# Patient Record
Sex: FEMALE | Race: BLACK OR AFRICAN AMERICAN | NOT HISPANIC OR LATINO | URBAN - METROPOLITAN AREA
[De-identification: names, ages, dates, MRNs, and addresses within clinical notes are randomized per-mention and may not be internally consistent; named-entity substitution may affect disease eponyms.]

---

## 2021-12-22 ENCOUNTER — EMERGENCY (EMERGENCY)
Facility: HOSPITAL | Age: 71
LOS: 1 days | Discharge: HOME | End: 2021-12-22
Attending: STUDENT IN AN ORGANIZED HEALTH CARE EDUCATION/TRAINING PROGRAM
Payer: MEDICARE

## 2021-12-22 VITALS
OXYGEN SATURATION: 95 % | TEMPERATURE: 98 F | DIASTOLIC BLOOD PRESSURE: 61 MMHG | HEART RATE: 60 BPM | RESPIRATION RATE: 18 BRPM | SYSTOLIC BLOOD PRESSURE: 100 MMHG

## 2021-12-22 VITALS
HEART RATE: 76 BPM | DIASTOLIC BLOOD PRESSURE: 101 MMHG | RESPIRATION RATE: 19 BRPM | OXYGEN SATURATION: 98 % | TEMPERATURE: 98 F | SYSTOLIC BLOOD PRESSURE: 159 MMHG | HEIGHT: 61 IN | WEIGHT: 102.07 LBS

## 2021-12-22 LAB
ALBUMIN SERPL ELPH-MCNC: 4.1 G/DL — SIGNIFICANT CHANGE UP (ref 3.3–5)
ALP SERPL-CCNC: 53 U/L — SIGNIFICANT CHANGE UP (ref 40–120)
ALT FLD-CCNC: 21 U/L — SIGNIFICANT CHANGE UP (ref 10–45)
ANION GAP SERPL CALC-SCNC: 15 MMOL/L — SIGNIFICANT CHANGE UP (ref 5–17)
APPEARANCE UR: CLEAR — SIGNIFICANT CHANGE UP
AST SERPL-CCNC: 29 U/L — SIGNIFICANT CHANGE UP (ref 10–40)
BACTERIA # UR AUTO: NEGATIVE — SIGNIFICANT CHANGE UP
BASE EXCESS BLDV CALC-SCNC: -1.2 MMOL/L — SIGNIFICANT CHANGE UP (ref -2–2)
BASOPHILS # BLD AUTO: 0.02 K/UL — SIGNIFICANT CHANGE UP (ref 0–0.2)
BASOPHILS NFR BLD AUTO: 0.2 % — SIGNIFICANT CHANGE UP (ref 0–2)
BILIRUB SERPL-MCNC: 0.4 MG/DL — SIGNIFICANT CHANGE UP (ref 0.2–1.2)
BILIRUB UR-MCNC: NEGATIVE — SIGNIFICANT CHANGE UP
BUN SERPL-MCNC: 13 MG/DL — SIGNIFICANT CHANGE UP (ref 7–23)
CA-I SERPL-SCNC: 1.16 MMOL/L — SIGNIFICANT CHANGE UP (ref 1.15–1.33)
CALCIUM SERPL-MCNC: 9.3 MG/DL — SIGNIFICANT CHANGE UP (ref 8.4–10.5)
CHLORIDE BLDV-SCNC: 103 MMOL/L — SIGNIFICANT CHANGE UP (ref 96–108)
CHLORIDE SERPL-SCNC: 100 MMOL/L — SIGNIFICANT CHANGE UP (ref 96–108)
CO2 BLDV-SCNC: 28 MMOL/L — HIGH (ref 22–26)
CO2 SERPL-SCNC: 22 MMOL/L — SIGNIFICANT CHANGE UP (ref 22–31)
COLOR SPEC: COLORLESS — SIGNIFICANT CHANGE UP
CREAT SERPL-MCNC: 1.06 MG/DL — SIGNIFICANT CHANGE UP (ref 0.5–1.3)
DIFF PNL FLD: NEGATIVE — SIGNIFICANT CHANGE UP
EOSINOPHIL # BLD AUTO: 0.01 K/UL — SIGNIFICANT CHANGE UP (ref 0–0.5)
EOSINOPHIL NFR BLD AUTO: 0.1 % — SIGNIFICANT CHANGE UP (ref 0–6)
EPI CELLS # UR: 1 /HPF — SIGNIFICANT CHANGE UP
GAS PNL BLDV: 134 MMOL/L — LOW (ref 136–145)
GAS PNL BLDV: SIGNIFICANT CHANGE UP
GLUCOSE BLDV-MCNC: 104 MG/DL — HIGH (ref 70–99)
GLUCOSE SERPL-MCNC: 170 MG/DL — HIGH (ref 70–99)
GLUCOSE UR QL: NEGATIVE — SIGNIFICANT CHANGE UP
HCO3 BLDV-SCNC: 26 MMOL/L — SIGNIFICANT CHANGE UP (ref 22–29)
HCT VFR BLD CALC: 43.2 % — SIGNIFICANT CHANGE UP (ref 34.5–45)
HCT VFR BLDA CALC: 42 % — SIGNIFICANT CHANGE UP (ref 34.5–46.5)
HGB BLD CALC-MCNC: 14.1 G/DL — SIGNIFICANT CHANGE UP (ref 11.7–16.1)
HGB BLD-MCNC: 13.7 G/DL — SIGNIFICANT CHANGE UP (ref 11.5–15.5)
HYALINE CASTS # UR AUTO: 1 /LPF — SIGNIFICANT CHANGE UP (ref 0–2)
IMM GRANULOCYTES NFR BLD AUTO: 0.5 % — SIGNIFICANT CHANGE UP (ref 0–1.5)
KETONES UR-MCNC: NEGATIVE — SIGNIFICANT CHANGE UP
LACTATE BLDV-MCNC: 1.9 MMOL/L — SIGNIFICANT CHANGE UP (ref 0.7–2)
LEUKOCYTE ESTERASE UR-ACNC: NEGATIVE — SIGNIFICANT CHANGE UP
LIDOCAIN IGE QN: 19 U/L — SIGNIFICANT CHANGE UP (ref 7–60)
LYMPHOCYTES # BLD AUTO: 1.35 K/UL — SIGNIFICANT CHANGE UP (ref 1–3.3)
LYMPHOCYTES # BLD AUTO: 16.7 % — SIGNIFICANT CHANGE UP (ref 13–44)
MCHC RBC-ENTMCNC: 27.2 PG — SIGNIFICANT CHANGE UP (ref 27–34)
MCHC RBC-ENTMCNC: 31.7 GM/DL — LOW (ref 32–36)
MCV RBC AUTO: 85.9 FL — SIGNIFICANT CHANGE UP (ref 80–100)
MONOCYTES # BLD AUTO: 0.46 K/UL — SIGNIFICANT CHANGE UP (ref 0–0.9)
MONOCYTES NFR BLD AUTO: 5.7 % — SIGNIFICANT CHANGE UP (ref 2–14)
NEUTROPHILS # BLD AUTO: 6.19 K/UL — SIGNIFICANT CHANGE UP (ref 1.8–7.4)
NEUTROPHILS NFR BLD AUTO: 76.8 % — SIGNIFICANT CHANGE UP (ref 43–77)
NITRITE UR-MCNC: NEGATIVE — SIGNIFICANT CHANGE UP
NRBC # BLD: 0 /100 WBCS — SIGNIFICANT CHANGE UP (ref 0–0)
PCO2 BLDV: 53 MMHG — HIGH (ref 39–42)
PH BLDV: 7.3 — LOW (ref 7.32–7.43)
PH UR: 6.5 — SIGNIFICANT CHANGE UP (ref 5–8)
PLATELET # BLD AUTO: 245 K/UL — SIGNIFICANT CHANGE UP (ref 150–400)
PO2 BLDV: 35 MMHG — SIGNIFICANT CHANGE UP (ref 25–45)
POTASSIUM BLDV-SCNC: 4.3 MMOL/L — SIGNIFICANT CHANGE UP (ref 3.5–5.1)
POTASSIUM SERPL-MCNC: 4.1 MMOL/L — SIGNIFICANT CHANGE UP (ref 3.5–5.3)
POTASSIUM SERPL-SCNC: 4.1 MMOL/L — SIGNIFICANT CHANGE UP (ref 3.5–5.3)
PROT SERPL-MCNC: 7 G/DL — SIGNIFICANT CHANGE UP (ref 6–8.3)
PROT UR-MCNC: NEGATIVE — SIGNIFICANT CHANGE UP
RBC # BLD: 5.03 M/UL — SIGNIFICANT CHANGE UP (ref 3.8–5.2)
RBC # FLD: 12.8 % — SIGNIFICANT CHANGE UP (ref 10.3–14.5)
RBC CASTS # UR COMP ASSIST: 3 /HPF — SIGNIFICANT CHANGE UP (ref 0–4)
SAO2 % BLDV: 56.5 % — LOW (ref 67–88)
SARS-COV-2 RNA SPEC QL NAA+PROBE: DETECTED
SODIUM SERPL-SCNC: 137 MMOL/L — SIGNIFICANT CHANGE UP (ref 135–145)
SP GR SPEC: 1.03 — HIGH (ref 1.01–1.02)
UROBILINOGEN FLD QL: NEGATIVE — SIGNIFICANT CHANGE UP
WBC # BLD: 8.07 K/UL — SIGNIFICANT CHANGE UP (ref 3.8–10.5)
WBC # FLD AUTO: 8.07 K/UL — SIGNIFICANT CHANGE UP (ref 3.8–10.5)
WBC UR QL: 1 /HPF — SIGNIFICANT CHANGE UP (ref 0–5)

## 2021-12-22 PROCEDURE — 93010 ELECTROCARDIOGRAM REPORT: CPT

## 2021-12-22 PROCEDURE — 84295 ASSAY OF SERUM SODIUM: CPT

## 2021-12-22 PROCEDURE — 82330 ASSAY OF CALCIUM: CPT

## 2021-12-22 PROCEDURE — 82803 BLOOD GASES ANY COMBINATION: CPT

## 2021-12-22 PROCEDURE — 85025 COMPLETE CBC W/AUTO DIFF WBC: CPT

## 2021-12-22 PROCEDURE — 82947 ASSAY GLUCOSE BLOOD QUANT: CPT

## 2021-12-22 PROCEDURE — 83690 ASSAY OF LIPASE: CPT

## 2021-12-22 PROCEDURE — 99284 EMERGENCY DEPT VISIT MOD MDM: CPT | Mod: 25

## 2021-12-22 PROCEDURE — G1004: CPT

## 2021-12-22 PROCEDURE — 85014 HEMATOCRIT: CPT

## 2021-12-22 PROCEDURE — 99283 EMERGENCY DEPT VISIT LOW MDM: CPT

## 2021-12-22 PROCEDURE — 36415 COLL VENOUS BLD VENIPUNCTURE: CPT

## 2021-12-22 PROCEDURE — 96375 TX/PRO/DX INJ NEW DRUG ADDON: CPT

## 2021-12-22 PROCEDURE — U0003: CPT

## 2021-12-22 PROCEDURE — U0005: CPT

## 2021-12-22 PROCEDURE — 81001 URINALYSIS AUTO W/SCOPE: CPT

## 2021-12-22 PROCEDURE — 74177 CT ABD & PELVIS W/CONTRAST: CPT | Mod: MG

## 2021-12-22 PROCEDURE — 99285 EMERGENCY DEPT VISIT HI MDM: CPT | Mod: CS,GC

## 2021-12-22 PROCEDURE — 96374 THER/PROPH/DIAG INJ IV PUSH: CPT | Mod: XU

## 2021-12-22 PROCEDURE — 82435 ASSAY OF BLOOD CHLORIDE: CPT

## 2021-12-22 PROCEDURE — 83605 ASSAY OF LACTIC ACID: CPT

## 2021-12-22 PROCEDURE — 84132 ASSAY OF SERUM POTASSIUM: CPT

## 2021-12-22 PROCEDURE — 87086 URINE CULTURE/COLONY COUNT: CPT

## 2021-12-22 PROCEDURE — 80053 COMPREHEN METABOLIC PANEL: CPT

## 2021-12-22 PROCEDURE — 74177 CT ABD & PELVIS W/CONTRAST: CPT | Mod: 26,MG

## 2021-12-22 PROCEDURE — 85018 HEMOGLOBIN: CPT

## 2021-12-22 RX ORDER — KETOROLAC TROMETHAMINE 30 MG/ML
15 SYRINGE (ML) INJECTION ONCE
Refills: 0 | Status: COMPLETED | OUTPATIENT
Start: 2021-12-22 | End: 2021-12-22

## 2021-12-22 RX ORDER — ONDANSETRON 8 MG/1
4 TABLET, FILM COATED ORAL ONCE
Refills: 0 | Status: COMPLETED | OUTPATIENT
Start: 2021-12-22 | End: 2021-12-22

## 2021-12-22 RX ORDER — MORPHINE SULFATE 50 MG/1
4 CAPSULE, EXTENDED RELEASE ORAL ONCE
Refills: 0 | Status: DISCONTINUED | OUTPATIENT
Start: 2021-12-22 | End: 2021-12-22

## 2021-12-22 RX ORDER — TAMSULOSIN HYDROCHLORIDE 0.4 MG/1
1 CAPSULE ORAL
Qty: 30 | Refills: 0
Start: 2021-12-22 | End: 2022-01-20

## 2021-12-22 RX ORDER — IBUPROFEN 200 MG
1 TABLET ORAL
Qty: 42 | Refills: 0
Start: 2021-12-22 | End: 2021-12-28

## 2021-12-22 RX ORDER — ACETAMINOPHEN 500 MG
2 TABLET ORAL
Qty: 56 | Refills: 0
Start: 2021-12-22 | End: 2021-12-28

## 2021-12-22 RX ORDER — SODIUM CHLORIDE 9 MG/ML
1000 INJECTION INTRAMUSCULAR; INTRAVENOUS; SUBCUTANEOUS ONCE
Refills: 0 | Status: COMPLETED | OUTPATIENT
Start: 2021-12-22 | End: 2021-12-22

## 2021-12-22 RX ADMIN — MORPHINE SULFATE 4 MILLIGRAM(S): 50 CAPSULE, EXTENDED RELEASE ORAL at 11:14

## 2021-12-22 RX ADMIN — SODIUM CHLORIDE 1000 MILLILITER(S): 9 INJECTION INTRAMUSCULAR; INTRAVENOUS; SUBCUTANEOUS at 11:14

## 2021-12-22 RX ADMIN — ONDANSETRON 4 MILLIGRAM(S): 8 TABLET, FILM COATED ORAL at 11:14

## 2021-12-22 NOTE — CONSULT NOTE ADULT - SUBJECTIVE AND OBJECTIVE BOX
HPI:  Patient is a 71y Female who presented with  PAST MEDICAL & SURGICAL HISTORY:  Kidney stone      FAMILY HISTORY:    SOCIAL HISTORY:   Tobacco hx:    MEDICATIONS  (STANDING):  ketorolac   Injectable 15 milliGRAM(s) IV Push Once    MEDICATIONS  (PRN):    Allergies    amoxicillin (Unknown)  ampicillin (Unknown)    Intolerances        REVIEW OF SYSTEMS: Pertinent positives and negatives as stated in HPI, otherwise negative    Vital signs  T(C): 36.6 (21 @ 11:27), Max: 36.6 (21 @ 10:17)  HR: 60 (21 @ 11:27)  BP: 165/83 (21 @ 11:27)  SpO2: 96% (21 @ 11:27)  Wt(kg): --    Output    UOP    Physical Exam  Gen: NAD  Pulm: No intercostal retractions  Back:  Abd: Soft, NT, ND  : Uncircumcised/Circumcised, no lesions.  No discharge or blood at urethral meatus.  Testes descended bilaterally.  Testes and epididymis nontender bilaterally.  Cremasteric reflex present bilaterally.    LABS:           @ 11:11    WBC 8.07  / Hct 43.2  / SCr 1.06         137  |  100  |  13  ----------------------------<  170<H>  4.1   |  22  |  1.06    Ca    9.3      22 Dec 2021 11:11    TPro  7.0  /  Alb  4.1  /  TBili  0.4  /  DBili  x   /  AST  29  /  ALT  21  /  AlkPhos  53        Urinalysis Basic - ( 22 Dec 2021 13:47 )    Color: Colorless / Appearance: Clear / S.035 / pH: x  Gluc: x / Ketone: Negative  / Bili: Negative / Urobili: Negative   Blood: x / Protein: Negative / Nitrite: Negative   Leuk Esterase: Negative / RBC: 3 /hpf / WBC 1 /HPF   Sq Epi: x / Non Sq Epi: 1 /hpf / Bacteria: Negative        Urine Cx:   Blood Cx:    RADIOLOGY:       HPI:  Patient is a 71y Female who presented with  PAST MEDICAL & SURGICAL HISTORY:  Kidney stone      FAMILY HISTORY:    SOCIAL HISTORY:   Tobacco hx:    MEDICATIONS  (STANDING):  ketorolac   Injectable 15 milliGRAM(s) IV Push Once    MEDICATIONS  (PRN):    Allergies    amoxicillin (Unknown)  ampicillin (Unknown)    Intolerances        REVIEW OF SYSTEMS: Pertinent positives and negatives as stated in HPI, otherwise negative    Vital signs  T(C): 36.6 (21 @ 11:27), Max: 36.6 (21 @ 10:17)  HR: 60 (21 @ 11:27)  BP: 165/83 (21 @ 11:27)  SpO2: 96% (21 @ 11:27)  Wt(kg): --    Output    UOP    Physical Exam  Gen: NAD  Pulm: No intercostal retractions  Back:  Abd: Soft, NT, ND  : Uncircumcised/Circumcised, no lesions.  No discharge or blood at urethral meatus.  Testes descended bilaterally.  Testes and epididymis nontender bilaterally.  Cremasteric reflex present bilaterally.    LABS:           @ 11:11    WBC 8.07  / Hct 43.2  / SCr 1.06         137  |  100  |  13  ----------------------------<  170<H>  4.1   |  22  |  1.06    Ca    9.3      22 Dec 2021 11:11    TPro  7.0  /  Alb  4.1  /  TBili  0.4  /  DBili  x   /  AST  29  /  ALT  21  /  AlkPhos  53        Urinalysis Basic - ( 22 Dec 2021 13:47 )    Color: Colorless / Appearance: Clear / S.035 / pH: x  Gluc: x / Ketone: Negative  / Bili: Negative / Urobili: Negative   Blood: x / Protein: Negative / Nitrite: Negative   Leuk Esterase: Negative / RBC: 3 /hpf / WBC 1 /HPF   Sq Epi: x / Non Sq Epi: 1 /hpf / Bacteria: Negative        Urine Cx:   Blood Cx:    RADIOLOGY:    ACC: [id number] EXAM:  CT ABDOMEN AND PELVIS IC                          PROCEDURE DATE:  2021          INTERPRETATION:  CLINICAL INFORMATION: History of kidney stones, presents   with right flank pain.    COMPARISON: None.    CONTRAST/COMPLICATIONS:  IV Contrast: Omnipaque 350  90 cc administered   10 cc discarded  Oral Contrast: NONE  Complications: None reported at time of study completion    PROCEDURE:  CT of the Abdomen and Pelvis was performed.  Sagittal and coronal reformats were performed.    FINDINGS:  LOWER CHEST: Within normal limits.    LIVER: Periportal edema.  BILE DUCTS: Normal caliber.  GALLBLADDER: Within normal limits.  SPLEEN: Within normal limits.  PANCREAS: Pancreatic duct is dilated, measuring 5 mm. No pancreatic mass.  ADRENALS: Within normal limits.  KIDNEYS/URETERS: Mild right hydroureteronephrosis. 7 mm calculus in the   proximal ureter. Free retroperitoneal fluid surrounding the renal pelvis   and proximal ureter likely reflects forniceal rupture. No left-sided   renal calculi.    BLADDER: Within normal limits.  REPRODUCTIVE ORGANS: 2 cm left adnexal mass, likely subserosal uterine   fibroid. 7 x 5 cm right adnexal cyst    BOWEL: No bowel obstruction. Appendix not well visualized.  PERITONEUM: No ascites.  VESSELS: Mild atheromatous calcifications.  RETROPERITONEUM/LYMPH NODES: No lymphadenopathy.  ABDOMINAL WALL: Within normal limits.  BONES: Disc degeneration lumbosacral junction.    IMPRESSION:  7 mm obstructive calculus proximal right ureter with evidence of   forniceal rupture.    7 cm right adnexal cyst, and smaller left adnexal lesion thought to   represent subserosal fibroid. Consider sonographic correlation.    Unexplained mild dilatation of pancreatic duct. Consider MRCP for further   evaluation.        --- End of Report ---            MONTANA PATTON MD; Attending Radiologist  This document has been electronically signed. Dec 22 2021  1:09PM     HPI:  Patient is a 71y Female who presented with right flank pain x few days.  Pt states the pain has been intermittent and manageable without any analgesics but today became too intense tolerate at home.  +nausea and vomiting x 1 earlier today.  Pt also states she had low grade temp 100.9F few days ago.  Denies any dysuria, difficulty voiding, gross hematuria.  States she has been aware of this stone for ~ one year.  PCP had conversations about it passing spontaneously but has never seen an urologist       PAST MEDICAL & SURGICAL HISTORY:  Kidney stone      FAMILY HISTORY:    SOCIAL HISTORY:   Tobacco hx:    MEDICATIONS  (STANDING):  ketorolac   Injectable 15 milliGRAM(s) IV Push Once    MEDICATIONS  (PRN):    Allergies    amoxicillin (Unknown)  ampicillin (Unknown)    Intolerances        REVIEW OF SYSTEMS: Pertinent positives and negatives as stated in HPI, otherwise negative    Vital signs  T(C): 36.6 (21 @ 11:27), Max: 36.6 (21 @ 10:17)  HR: 60 (21 @ 11:27)  BP: 165/83 (21 @ 11:27)  SpO2: 96% (21 @ 11:27)  Wt(kg): --    Output    UOP    Physical Exam  Gen: NAD  Pulm: No intercostal retractions  Back: No CVAT b/l  Abd: Soft, NT, ND  : wnl      LABS:       @ 11:11    WBC 8.07  / Hct 43.2  / SCr 1.06         137  |  100  |  13  ----------------------------<  170<H>  4.1   |  22  |  1.06    Ca    9.3      22 Dec 2021 11:11    TPro  7.0  /  Alb  4.1  /  TBili  0.4  /  DBili  x   /  AST  29  /  ALT  21  /  AlkPhos  53        Urinalysis Basic - ( 22 Dec 2021 13:47 )    Color: Colorless / Appearance: Clear / S.035 / pH: x  Gluc: x / Ketone: Negative  / Bili: Negative / Urobili: Negative   Blood: x / Protein: Negative / Nitrite: Negative   Leuk Esterase: Negative / RBC: 3 /hpf / WBC 1 /HPF   Sq Epi: x / Non Sq Epi: 1 /hpf / Bacteria: Negative        Urine Cx:   Blood Cx:    RADIOLOGY:    ACC: [id number] EXAM:  CT ABDOMEN AND PELVIS IC                          PROCEDURE DATE:  2021          INTERPRETATION:  CLINICAL INFORMATION: History of kidney stones, presents   with right flank pain.    COMPARISON: None.    CONTRAST/COMPLICATIONS:  IV Contrast: Omnipaque 350  90 cc administered   10 cc discarded  Oral Contrast: NONE  Complications: None reported at time of study completion    PROCEDURE:  CT of the Abdomen and Pelvis was performed.  Sagittal and coronal reformats were performed.    FINDINGS:  LOWER CHEST: Within normal limits.    LIVER: Periportal edema.  BILE DUCTS: Normal caliber.  GALLBLADDER: Within normal limits.  SPLEEN: Within normal limits.  PANCREAS: Pancreatic duct is dilated, measuring 5 mm. No pancreatic mass.  ADRENALS: Within normal limits.  KIDNEYS/URETERS: Mild right hydroureteronephrosis. 7 mm calculus in the   proximal ureter. Free retroperitoneal fluid surrounding the renal pelvis   and proximal ureter likely reflects forniceal rupture. No left-sided   renal calculi.    BLADDER: Within normal limits.  REPRODUCTIVE ORGANS: 2 cm left adnexal mass, likely subserosal uterine   fibroid. 7 x 5 cm right adnexal cyst    BOWEL: No bowel obstruction. Appendix not well visualized.  PERITONEUM: No ascites.  VESSELS: Mild atheromatous calcifications.  RETROPERITONEUM/LYMPH NODES: No lymphadenopathy.  ABDOMINAL WALL: Within normal limits.  BONES: Disc degeneration lumbosacral junction.    IMPRESSION:  7 mm obstructive calculus proximal right ureter with evidence of   forniceal rupture.    7 cm right adnexal cyst, and smaller left adnexal lesion thought to   represent subserosal fibroid. Consider sonographic correlation.    Unexplained mild dilatation of pancreatic duct. Consider MRCP for further   evaluation.        --- End of Report ---            MONTANA PATTON MD; Attending Radiologist  This document has been electronically signed. Dec 22 2021  1:09PM     HPI:  Patient is a 71y Female who presented with right flank pain x few days.  Pt states the pain has been intermittent and manageable without any analgesics but today became too intense tolerate at home.  +nausea and vomiting x 1 earlier today.  Pt also states she had low grade temp 100.6F 3 days ago, without fevers since.  Denies any dysuria, difficulty voiding, gross hematuria.  States she has been aware of this stone for ~ one year.  PCP had conversations about it passing spontaneously but has never seen an urologist       PAST MEDICAL & SURGICAL HISTORY:  Kidney stone      FAMILY HISTORY:    SOCIAL HISTORY:   Tobacco hx:    MEDICATIONS  (STANDING):  ketorolac   Injectable 15 milliGRAM(s) IV Push Once    MEDICATIONS  (PRN):    Allergies    amoxicillin (Unknown)  ampicillin (Unknown)    Intolerances        REVIEW OF SYSTEMS: Pertinent positives and negatives as stated in HPI, otherwise negative    Vital signs  T(C): 36.6 (21 @ 11:27), Max: 36.6 (21 @ 10:17)  HR: 60 (21 @ 11:27)  BP: 165/83 (21 @ 11:27)  SpO2: 96% (21 @ 11:27)  Wt(kg): --    Output    UOP    Physical Exam  Gen: NAD  Pulm: No intercostal retractions  Back: No CVAT b/l  Abd: Soft, NT, ND  : wnl      LABS:       @ 11:11    WBC 8.07  / Hct 43.2  / SCr 1.06         137  |  100  |  13  ----------------------------<  170<H>  4.1   |  22  |  1.06    Ca    9.3      22 Dec 2021 11:11    TPro  7.0  /  Alb  4.1  /  TBili  0.4  /  DBili  x   /  AST  29  /  ALT  21  /  AlkPhos  53        Urinalysis Basic - ( 22 Dec 2021 13:47 )    Color: Colorless / Appearance: Clear / S.035 / pH: x  Gluc: x / Ketone: Negative  / Bili: Negative / Urobili: Negative   Blood: x / Protein: Negative / Nitrite: Negative   Leuk Esterase: Negative / RBC: 3 /hpf / WBC 1 /HPF   Sq Epi: x / Non Sq Epi: 1 /hpf / Bacteria: Negative        Urine Cx:   Blood Cx:    RADIOLOGY:    ACC: [id number] EXAM:  CT ABDOMEN AND PELVIS IC                          PROCEDURE DATE:  2021          INTERPRETATION:  CLINICAL INFORMATION: History of kidney stones, presents   with right flank pain.    COMPARISON: None.    CONTRAST/COMPLICATIONS:  IV Contrast: Omnipaque 350  90 cc administered   10 cc discarded  Oral Contrast: NONE  Complications: None reported at time of study completion    PROCEDURE:  CT of the Abdomen and Pelvis was performed.  Sagittal and coronal reformats were performed.    FINDINGS:  LOWER CHEST: Within normal limits.    LIVER: Periportal edema.  BILE DUCTS: Normal caliber.  GALLBLADDER: Within normal limits.  SPLEEN: Within normal limits.  PANCREAS: Pancreatic duct is dilated, measuring 5 mm. No pancreatic mass.  ADRENALS: Within normal limits.  KIDNEYS/URETERS: Mild right hydroureteronephrosis. 7 mm calculus in the   proximal ureter. Free retroperitoneal fluid surrounding the renal pelvis   and proximal ureter likely reflects forniceal rupture. No left-sided   renal calculi.    BLADDER: Within normal limits.  REPRODUCTIVE ORGANS: 2 cm left adnexal mass, likely subserosal uterine   fibroid. 7 x 5 cm right adnexal cyst    BOWEL: No bowel obstruction. Appendix not well visualized.  PERITONEUM: No ascites.  VESSELS: Mild atheromatous calcifications.  RETROPERITONEUM/LYMPH NODES: No lymphadenopathy.  ABDOMINAL WALL: Within normal limits.  BONES: Disc degeneration lumbosacral junction.    IMPRESSION:  7 mm obstructive calculus proximal right ureter with evidence of   forniceal rupture.    7 cm right adnexal cyst, and smaller left adnexal lesion thought to   represent subserosal fibroid. Consider sonographic correlation.    Unexplained mild dilatation of pancreatic duct. Consider MRCP for further   evaluation.        --- End of Report ---            MONTANA PATTON MD; Attending Radiologist  This document has been electronically signed. Dec 22 2021  1:09PM

## 2021-12-22 NOTE — ED PROVIDER NOTE - OBJECTIVE STATEMENT
70 yo female with pmhx right kidney stone, right ovarian cyst, presenting with 4 days of fevers, right back pain, BL abd pain. States she has initial left sided abd pain, now progressing to right side, also endorsing right flank pain, fever 100.6 F 3 days ago. Came to ED for further evaluation. Had kidney stone 6 mo ago, uncertain if it passed. also had vomiting episode today.    denies diarrhea, dysuria, CP, SOB, LOC.

## 2021-12-22 NOTE — ED ADULT NURSE NOTE - OBJECTIVE STATEMENT
70 y/o female denies PMH presents to ED reporting abdominal pain. Pt reports RUQ pain radiating to R flank for several days. Pt also reports nausea. On exam, AOx3, answering questions. Unlabored, spontaneous respirations, NAD. Abdomen soft, tender RUQ, non-distended. Pt denies CP, SOB, fever/chills at this time. Heplock placed, labs sent. MD at bedside to evaluate pt.

## 2021-12-22 NOTE — ED PROVIDER NOTE - ATTENDING CONTRIBUTION TO CARE
I performed a history and physical exam of the patient and discussed their management with the resident and /or advanced care provider. I reviewed the resident and /or ACP's note and agree with the documented findings and plan of care except where otherwise noted. My medical decision making and observations are found below     72 yo F with PMH kidney stones and R ovarian cyst presents with 4 days of worsening RUQ pain now located in RLQ associated with nausea and vomiting, nonbloody, and T 100.6 at home. No diarrhea, no melena, no hematochezia.     PHYSICAL EXAM:   General: appears uncomfortable  HEENT: NC/AT, airway patent  Cardiovascular: regular rate   Respiratory: nonlabored respirations  Abdominal: soft, +RUQ ttp and RLQ ttp, neg murphys sign, nondistended, no rebound, guarding or rigidity  Back: no costovertebral tenderness, no rashes noted  Extremities: equal radial pulses  Neuro: Awake, alert, interactive  Psychiatric: appropriate mood and affect.   -Sherice Lechuga MD Attending Physician     MDM: hx and physical as noted above. ddx includes but not limited to renal stone vs pyelo (less likely without urinary symptoms) vs appendicitis. low suspicion dissection given clinical picture. will get labs, pain control, ct. dispo pending.

## 2021-12-22 NOTE — CONSULT NOTE ADULT - ASSESSMENT
72 y/o female with 7mm proximal ureteral calculus.  Currently pain free      -no acute gu intervention at this time  -follow up urine culture  -flomax 0.4mg qhs  -stay well hydrated  -dc home with pain meds and follow up with Dr NEO Acosta [phone] to discuss definitive stone mgmt  will discuss with attending, full recs to follow 72 y/o female with 7mm proximal ureteral calculus.  Currently pain free      -no acute gu intervention at this time  -follow up urine culture  -flomax 0.4mg qhs  -stay well hydrated  -return to ED if patient is febrile, pain uncontrolled, not able to tolerate po   -dc home with pain meds and follow up with Dr NEO Acosta [phone] to discuss definitive stone mgmt        Discussed with Dr. Acosta  72 y/o female with 7mm proximal ureteral calculus.  Currently pain free and afebrile.  Nontoxic appearing      -no acute gu intervention at this time; pt is not interested in intervention at this time regardless  -unlikely septic stone as low grade temp reportedly few days ago without fevers since.  Additionally pt +COVID which may be source of low grade temp.  Furthermore Wbc wnl, UA negative.  -follow up urine culture  -flomax 0.4mg qhs  -stay well hydrated  -return to ED if patient is febrile, pain uncontrolled, not able to tolerate po   -dc home with pain meds and follow up with Dr NEO Acosta [phone] to discuss definitive stone mgmt        Discussed with Dr. Acosta

## 2021-12-22 NOTE — ED PROVIDER NOTE - CARE PLAN
1 Principal Discharge DX:	Right kidney stone  Secondary Diagnosis:	2019 novel coronavirus disease (COVID-19)

## 2021-12-22 NOTE — ED PROVIDER NOTE - NSFOLLOWUPINSTRUCTIONS_ED_ALL_ED_FT
Activities as tolerated. Please encourage good oral and fluid intake. For pain, please take Motrin 400mg every 4 hours as needed, or Tylenol 650mg every 6 hours as needed. Can use flomax once a day.    Please see your primary care doctor within 24-48 hours for further management of your symptoms.  Please follow up with urologist Dr. Acosta [phone] in one week for further evaluation of your symptoms.    Please seek emergent medical management if you have any worsening signs or symptoms, such as worsening abdominal pain, chest pain, difficulty breathing, loss of consciousness, or persistent vomiting.    Please isolate and quarantine for 7-10 days for COVID positive test.

## 2021-12-22 NOTE — ED PROVIDER NOTE - PROGRESS NOTE DETAILS
JEANNETTE Howard MD  ct shows 7mm right prox ureter stone with forniceal rupture. consult uro, states patient is cleared to be dc with pain control, po fluids, and uro follow up. explained to pt and daughter, understand. pt now covid+, explained to pt and family, understands. ap- spoke w/ pt regarding covid 19 ambulatory sat of 100 percent, she has no cp, no sob, informed of quaratine recs, pt comfortable going home at this time.

## 2021-12-22 NOTE — ED PROVIDER NOTE - PATIENT PORTAL LINK FT
You can access the FollowMyHealth Patient Portal offered by [facility] by registering at the following website: [email or web address]. By joining [facility]’s FollowMyHealth portal, you will also be able to view your health information using other applications (apps) compatible with our system.

## 2021-12-22 NOTE — ED PROVIDER NOTE - CLINICAL SUMMARY MEDICAL DECISION MAKING FREE TEXT BOX
70 yo female with pmhx right kidney stone, right ovarian cyst, presenting with 4 days of fevers, right back pain, BL abd pain. suggestive of pyelo vs kidney stone, but will eval for intrabdominal vs RP pathology with labs, ct, urine studies, will give ivf, pain control, zofran, and will reassess.

## 2021-12-23 LAB
CULTURE RESULTS: SIGNIFICANT CHANGE UP
SPECIMEN SOURCE: SIGNIFICANT CHANGE UP
